# Patient Record
Sex: FEMALE | Race: BLACK OR AFRICAN AMERICAN | NOT HISPANIC OR LATINO | Employment: STUDENT | URBAN - METROPOLITAN AREA
[De-identification: names, ages, dates, MRNs, and addresses within clinical notes are randomized per-mention and may not be internally consistent; named-entity substitution may affect disease eponyms.]

---
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
## 2025-01-03 ENCOUNTER — APPOINTMENT (OUTPATIENT)
Dept: OBSTETRICS AND GYNECOLOGY | Facility: HOSPITAL | Age: 20
End: 2025-01-03
Payer: MEDICAID

## 2025-01-20 ENCOUNTER — APPOINTMENT (OUTPATIENT)
Dept: OBSTETRICS AND GYNECOLOGY | Facility: CLINIC | Age: 20
End: 2025-01-20
Payer: MEDICAID

## 2025-03-03 ENCOUNTER — APPOINTMENT (OUTPATIENT)
Dept: PRIMARY CARE | Facility: CLINIC | Age: 20
End: 2025-03-03
Payer: MEDICAID

## 2025-03-03 VITALS
HEIGHT: 66 IN | BODY MASS INDEX: 22.18 KG/M2 | WEIGHT: 138 LBS | SYSTOLIC BLOOD PRESSURE: 115 MMHG | HEART RATE: 102 BPM | OXYGEN SATURATION: 98 % | DIASTOLIC BLOOD PRESSURE: 74 MMHG

## 2025-03-03 DIAGNOSIS — J06.9 URTI (ACUTE UPPER RESPIRATORY INFECTION): Primary | ICD-10-CM

## 2025-03-03 PROCEDURE — 99213 OFFICE O/P EST LOW 20 MIN: CPT | Performed by: STUDENT IN AN ORGANIZED HEALTH CARE EDUCATION/TRAINING PROGRAM

## 2025-03-03 PROCEDURE — 3008F BODY MASS INDEX DOCD: CPT | Performed by: STUDENT IN AN ORGANIZED HEALTH CARE EDUCATION/TRAINING PROGRAM

## 2025-03-03 NOTE — PROGRESS NOTES
Subjective   Patient ID: Elisa Lopez is a 19 y.o. female who presents for Back Pain (One week).  HPI  Patient is here for the following reasons    -Back  pain-lower back pain ongoing for 1 week.  No trauma or fall  Mild to moderate nonradiating.  For 1 week    - her brother tested positive for flu recently and then patient started to have chest pain-upper chest pain  with cough.  Does endorse productive sputum when cough.  No shortness of breath.      Past Medical History:   Diagnosis Date    Anemia, unspecified 10/10/2022    Anemia    Anxiety disorder, unspecified 08/11/2020    Anxiety    COVID-19 12/10/2021    COVID    Depression, unspecified 11/15/2019    Depression    Influenza due to other identified influenza virus with other respiratory manifestations 02/12/2020    Influenza B    Other conditions influencing health status 02/12/2020    History of cough    Other fecal abnormalities 08/11/2020    Abnormal stool color    Other specified health status     No pertinent past medical history    Personal history of other diseases of the nervous system and sense organs 10/10/2021    History of blurred vision    Personal history of other specified conditions 02/12/2020    History of left flank pain    Personal history of other specified conditions 08/11/2020    History of vomiting    Personal history of other specified conditions 09/19/2019    History of right flank pain      No past surgical history on file.   Family History   Problem Relation Name Age of Onset    Other (hypoglycemia) Mother      Diabetes Mother's Sister        Allergies   Allergen Reactions    Kiwi Unknown    Pineapple Unknown          Occupation:     Review of Systems   Constitutional:  Negative for activity change and fever.   HENT:  Negative for congestion.    Respiratory:  Positive for cough. Negative for shortness of breath and wheezing.    Cardiovascular:  Negative for chest pain and leg swelling.   Gastrointestinal:  Negative for abdominal  "pain, constipation, nausea and vomiting.   Endocrine: Negative for cold intolerance.   Genitourinary:  Negative for dysuria, hematuria and urgency.   Neurological:  Negative for dizziness, speech difficulty, weakness and numbness.   Psychiatric/Behavioral:  Negative for self-injury and suicidal ideas.        Objective   Visit Vitals  /74 (BP Location: Right arm, Patient Position: Sitting, BP Cuff Size: Adult)   Pulse 102   Ht 1.676 m (5' 6\")   Wt 62.6 kg (138 lb)   SpO2 98%   BMI 22.27 kg/m²   OB Status IUD   Smoking Status Never   BSA 1.71 m²      Physical Exam  Constitutional:       Appearance: Normal appearance.   HENT:      Head: Normocephalic and atraumatic.      Nose: Nose normal.      Mouth/Throat:      Mouth: Mucous membranes are moist.   Eyes:      Conjunctiva/sclera: Conjunctivae normal.      Pupils: Pupils are equal, round, and reactive to light.   Cardiovascular:      Rate and Rhythm: Normal rate and regular rhythm.      Pulses: Normal pulses.      Heart sounds: Normal heart sounds.   Pulmonary:      Effort: Pulmonary effort is normal.      Breath sounds: Normal breath sounds. No wheezing.   Musculoskeletal:         General: Normal range of motion.      Cervical back: Neck supple.   Skin:     General: Skin is warm.   Neurological:      General: No focal deficit present.      Mental Status: She is alert and oriented to person, place, and time.   Psychiatric:         Mood and Affect: Mood normal.         Behavior: Behavior normal.         Thought Content: Thought content normal.         Judgment: Judgment normal.     no point tenderness along palpation of entire spine  + paraspinal tenderness  SLR negative  neurological exam for motor, sensory and reflexes normal and equal bilaterlly  no f/o saddle anaesthesia      Assessment/Plan   Diagnoses and all orders for this visit:  URTI (acute upper respiratory infection)  -     Sars-CoV-2 and Influenza A/B PCR; Future    All symptoms correspond to upper " respiratory tract infection/viral infection.  Patient to continue to hydrate.  Check for COVID flu.  Strict return precautions discussed.  Advise hydration and OTC Tylenol as needed.  Verbalizes understanding and agreeable to plan

## 2025-03-03 NOTE — LETTER
March 3, 2025     Patient: Elisa Lopez   YOB: 2005   Date of Visit: 3/3/2025       To Whom It May Concern:    Elisa Lopez was seen in my clinic on 3/3/2025 at 9:40 am. Please excuse Elisa for her absence from work from 3/3/2025 to 3/5/2025.    If you have any questions or concerns, please don't hesitate to call.         Sincerely,         Jumana Tran MD

## 2025-03-10 ASSESSMENT — ENCOUNTER SYMPTOMS
HEMATURIA: 0
NAUSEA: 0
VOMITING: 0
DIZZINESS: 0
ABDOMINAL PAIN: 0
WHEEZING: 0
WEAKNESS: 0
SPEECH DIFFICULTY: 0
CONSTIPATION: 0
SHORTNESS OF BREATH: 0
DYSURIA: 0
ACTIVITY CHANGE: 0
COUGH: 1
FEVER: 0
NUMBNESS: 0

## 2025-05-17 ENCOUNTER — OFFICE VISIT (OUTPATIENT)
Dept: URGENT CARE | Age: 20
End: 2025-05-17
Payer: COMMERCIAL

## 2025-05-17 VITALS
RESPIRATION RATE: 18 BRPM | SYSTOLIC BLOOD PRESSURE: 105 MMHG | WEIGHT: 140 LBS | DIASTOLIC BLOOD PRESSURE: 69 MMHG | HEIGHT: 66 IN | TEMPERATURE: 98.1 F | BODY MASS INDEX: 22.5 KG/M2

## 2025-05-17 DIAGNOSIS — R30.9 PAINFUL URINATION: ICD-10-CM

## 2025-05-17 LAB
POC APPEARANCE, URINE: CLEAR
POC BILIRUBIN, URINE: NEGATIVE
POC BLOOD, URINE: ABNORMAL
POC COLOR, URINE: YELLOW
POC GLUCOSE, URINE: NEGATIVE MG/DL
POC KETONES, URINE: NEGATIVE MG/DL
POC LEUKOCYTES, URINE: ABNORMAL
POC NITRITE,URINE: NEGATIVE
POC PH, URINE: 6 PH
POC PROTEIN, URINE: NEGATIVE MG/DL
POC SPECIFIC GRAVITY, URINE: 1.02
POC UROBILINOGEN, URINE: 0.2 EU/DL
PREGNANCY TEST URINE, POC: NEGATIVE

## 2025-05-17 ASSESSMENT — ENCOUNTER SYMPTOMS
SHORTNESS OF BREATH: 0
HEADACHES: 0
CHILLS: 0
COUGH: 0
FREQUENCY: 1
FATIGUE: 0
FEVER: 0

## 2025-05-17 NOTE — PROGRESS NOTES
"Subjective   Patient ID: Elisa Lopez is a 20 y.o. female. They present today with a chief complaint of Urinary Frequency (Frequent and painful urination.).    History of Present Illness  Patient is a 20-year-old female with past medical history of iron deficiency anemia.  Patient complaining of UTI symptoms and pain frequency and cloudiness.  Patient denies any fever vomiting flank pain.  Patient states she has had UTIs previously and this feels similar      History provided by:  Patient  Urinary Frequency  Associated symptoms: no chest pain, no cough, no fatigue, no fever, no headaches and no shortness of breath        Past Medical History  Allergies as of 05/17/2025 - Reviewed 05/17/2025   Allergen Reaction Noted    Kiwi Unknown 03/15/2023    Pineapple Unknown 03/15/2023       Prescriptions Prior to Admission[1]     Medical History[2]    Surgical History[3]     reports that she has never smoked. She has never been exposed to tobacco smoke. She has never used smokeless tobacco. She reports that she does not currently use alcohol. She reports that she does not currently use drugs.    Review of Systems  Review of Systems   Constitutional:  Negative for chills, fatigue and fever.   Respiratory:  Negative for cough and shortness of breath.    Cardiovascular:  Negative for chest pain.   Genitourinary:  Positive for frequency.   Neurological:  Negative for headaches.                                  Objective    Vitals:    05/17/25 1721   BP: 105/69   Resp: 18   Temp: 36.7 °C (98.1 °F)   Weight: 63.5 kg (140 lb)   Height: 1.676 m (5' 6\")     No LMP recorded. (Menstrual status: IUD).    Physical Exam  Constitutional:       General: She is not in acute distress.     Appearance: She is not ill-appearing or toxic-appearing.   HENT:      Head: Normocephalic and atraumatic.   Eyes:      Extraocular Movements: Extraocular movements intact.   Pulmonary:      Effort: Pulmonary effort is normal.   Musculoskeletal:         " General: No swelling or deformity.   Skin:     General: Skin is warm and dry.   Neurological:      Mental Status: She is alert and oriented to person, place, and time.         Procedures    Point of Care Test & Imaging Results from this visit  Results for orders placed or performed in visit on 05/17/25   POCT UA Automated manually resulted   Result Value Ref Range    POC Color, Urine Yellow Straw, Yellow, Light-Yellow    POC Appearance, Urine Clear Clear    POC Glucose, Urine NEGATIVE NEGATIVE mg/dl    POC Bilirubin, Urine NEGATIVE NEGATIVE    POC Ketones, Urine NEGATIVE NEGATIVE mg/dl    POC Specific Gravity, Urine 1.020 1.005 - 1.035    POC Blood, Urine MODERATE (2+) (A) NEGATIVE    POC PH, Urine 6.0 No Reference Range Established PH    POC Protein, Urine NEGATIVE NEGATIVE mg/dl    POC Urobilinogen, Urine 0.2 0.2, 1.0 EU/DL    Poc Nitrite, Urine NEGATIVE NEGATIVE    POC Leukocytes, Urine TRACE (A) NEGATIVE   POCT pregnancy, urine manually resulted   Result Value Ref Range    Preg Test, Ur Negative Negative      Imaging  No results found.    Cardiology, Vascular, and Other Imaging  No other imaging results found for the past 2 days      Diagnostic study results (if any) were reviewed by Jennifer Berger MD.    Assessment/Plan   Allergies, medications, history, and pertinent labs/EKGs/Imaging reviewed by Jennifer Berger MD.     Medical Decision Making  Patient was seen and examined.  Patient has symptoms of dysuria and trace leukocyte esterase.   patient was treated for urinary tract infection with Keflex.  Patient will be discharged home to follow-up with primary care physician.    Orders and Diagnoses  Diagnoses and all orders for this visit:  Painful urination  -     POCT UA Automated manually resulted  -     POCT pregnancy, urine manually resulted      Medical Admin Record      Patient disposition: Home    Electronically signed by Jennifer Berger MD  5:46 PM           [1] (Not in a hospital admission)  [2]    Past Medical History:  Diagnosis Date    Anemia, unspecified 10/10/2022    Anemia    Anxiety disorder, unspecified 08/11/2020    Anxiety    COVID-19 12/10/2021    COVID    Depression, unspecified 11/15/2019    Depression    Influenza due to other identified influenza virus with other respiratory manifestations 02/12/2020    Influenza B    Other conditions influencing health status 02/12/2020    History of cough    Other fecal abnormalities 08/11/2020    Abnormal stool color    Other specified health status     No pertinent past medical history    Personal history of other diseases of the nervous system and sense organs 10/10/2021    History of blurred vision    Personal history of other specified conditions 02/12/2020    History of left flank pain    Personal history of other specified conditions 08/11/2020    History of vomiting    Personal history of other specified conditions 09/19/2019    History of right flank pain   [3] No past surgical history on file.